# Patient Record
Sex: FEMALE | Race: BLACK OR AFRICAN AMERICAN | NOT HISPANIC OR LATINO | Employment: STUDENT | URBAN - METROPOLITAN AREA
[De-identification: names, ages, dates, MRNs, and addresses within clinical notes are randomized per-mention and may not be internally consistent; named-entity substitution may affect disease eponyms.]

---
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
## 2023-03-17 ENCOUNTER — OFFICE VISIT (OUTPATIENT)
Dept: FAMILY MEDICINE CLINIC | Facility: CLINIC | Age: 18
End: 2023-03-17
Payer: COMMERCIAL

## 2023-03-17 VITALS
WEIGHT: 165.8 LBS | HEART RATE: 71 BPM | HEIGHT: 65 IN | SYSTOLIC BLOOD PRESSURE: 110 MMHG | TEMPERATURE: 98.5 F | DIASTOLIC BLOOD PRESSURE: 80 MMHG | OXYGEN SATURATION: 98 % | BODY MASS INDEX: 27.63 KG/M2

## 2023-03-17 DIAGNOSIS — N63.21 MASS OF UPPER OUTER QUADRANT OF LEFT BREAST: Primary | ICD-10-CM

## 2023-03-17 DIAGNOSIS — F32.9 MAJOR DEPRESSIVE DISORDER WITH CURRENT ACTIVE EPISODE, UNSPECIFIED DEPRESSION EPISODE SEVERITY, UNSPECIFIED WHETHER RECURRENT: ICD-10-CM

## 2023-03-17 PROCEDURE — 99214 OFFICE O/P EST MOD 30 MIN: CPT | Performed by: FAMILY MEDICINE

## 2023-03-17 PROCEDURE — 1160F RVW MEDS BY RX/DR IN RCRD: CPT | Performed by: FAMILY MEDICINE

## 2023-03-17 PROCEDURE — 1159F MED LIST DOCD IN RCRD: CPT | Performed by: FAMILY MEDICINE

## 2023-03-17 RX ORDER — ARIPIPRAZOLE 10 MG/1
TABLET ORAL
COMMUNITY
Start: 2023-03-14 | End: 2023-03-17 | Stop reason: SDUPTHER

## 2023-03-17 RX ORDER — ARIPIPRAZOLE 10 MG/1
10 TABLET ORAL DAILY
Qty: 90 TABLET | Refills: 3 | Status: SHIPPED | OUTPATIENT
Start: 2023-03-17

## 2023-03-17 NOTE — PROGRESS NOTES
"Subjective   WALDEMAR Parker is a 17 y.o. female.     Chief Complaint   Patient presents with   • Cyst     Left breast, inside sensitive, knot for at least a month        History of Present Illness   Patient has had a sensitive left breast not for at least a month now.  I can see where she has had this in the past.  She saw a breast surgeon and had a cyst aspirated about 9 months ago.  From that left breast.    Pt was recently in the [place] for anxiety. She was started on Abilify and doing much better. She was not diagnosed bipolar. Cbc and cmp one week ago was ok.     The following portions of the patient's history were reviewed and updated as appropriate: allergies, current medications, past family history, past medical history, past social history, past surgical history and problem list.    History reviewed. No pertinent past medical history.    Past Surgical History:   Procedure Laterality Date   • HERNIA REPAIR     • NOSE SURGERY  01/01/2020       Family History   Problem Relation Age of Onset   • Cancer Mother 33        pelvic lymphoma       Social History     Socioeconomic History   • Marital status: Single   Tobacco Use   • Smoking status: Never   • Smokeless tobacco: Never   • Tobacco comments:     occasional vapes   Vaping Use   • Vaping Use: Some days   Substance and Sexual Activity   • Alcohol use: Yes     Comment: occ   • Drug use: Never   • Sexual activity: Defer       Review of Systems   Respiratory: Negative for shortness of breath.    Psychiatric/Behavioral: Negative for suicidal ideas.       Objective   Visit Vitals  /80 (BP Location: Left arm, Patient Position: Sitting)   Pulse 71   Temp 98.5 °F (36.9 °C)   Ht 165.1 cm (65\")   Wt 75.2 kg (165 lb 12.8 oz)   SpO2 98%   BMI 27.59 kg/m²     Body mass index is 27.59 kg/m².  Physical Exam  Constitutional:       Appearance: Normal appearance. She is well-developed.   Cardiovascular:      Rate and Rhythm: Normal rate and regular rhythm.      Heart " sounds: Normal heart sounds.   Pulmonary:      Effort: Pulmonary effort is normal.      Breath sounds: Normal breath sounds.   Chest:       Musculoskeletal:         General: No swelling. Normal range of motion.   Skin:     General: Skin is warm and dry.      Findings: No rash.   Neurological:      General: No focal deficit present.      Mental Status: She is alert and oriented to person, place, and time.   Psychiatric:         Mood and Affect: Mood normal.         Behavior: Behavior normal.           Assessment & Plan   Diagnoses and all orders for this visit:    1. Mass of upper outer quadrant of left breast (Primary)  -     US Breast Left Limited; Future    2. Major depressive disorder with current active episode, unspecified depression episode severity, unspecified whether recurrent  -     ARIPiprazole (ABILIFY) 10 MG tablet; Take 1 tablet by mouth Daily.  Dispense: 90 tablet; Refill: 3        Cont meds, f/u in 1-3 months. Will refer to fransico jean pending results.

## 2023-04-13 ENCOUNTER — HOSPITAL ENCOUNTER (OUTPATIENT)
Dept: ULTRASOUND IMAGING | Facility: HOSPITAL | Age: 18
Discharge: HOME | End: 2023-04-13
Admitting: FAMILY MEDICINE
Payer: COMMERCIAL

## 2023-04-13 DIAGNOSIS — N63.21 MASS OF UPPER OUTER QUADRANT OF LEFT BREAST: ICD-10-CM

## 2023-04-13 PROCEDURE — 76642 ULTRASOUND BREAST LIMITED: CPT

## 2023-06-05 DIAGNOSIS — S69.92XA INJURY OF FINGER OF LEFT HAND, INITIAL ENCOUNTER: ICD-10-CM

## 2023-06-05 RX ORDER — IBUPROFEN 400 MG/1
400 TABLET ORAL EVERY 8 HOURS PRN
Qty: 60 TABLET | Refills: 1 | Status: CANCELLED | OUTPATIENT
Start: 2023-06-05

## 2023-06-06 DIAGNOSIS — S69.92XA INJURY OF FINGER OF LEFT HAND, INITIAL ENCOUNTER: ICD-10-CM

## 2023-06-07 RX ORDER — IBUPROFEN 400 MG/1
400 TABLET ORAL EVERY 8 HOURS PRN
Qty: 60 TABLET | Refills: 1 | Status: SHIPPED | OUTPATIENT
Start: 2023-06-07

## 2023-09-14 ENCOUNTER — TELEPHONE (OUTPATIENT)
Dept: FAMILY MEDICINE CLINIC | Facility: CLINIC | Age: 18
End: 2023-09-14

## 2023-09-14 NOTE — TELEPHONE ENCOUNTER
Caller: iker maravilla    Relationship: Mother    Best call back number: [phone]     What is the best time to reach you: ANYTIME    Who are you requesting to speak with (clinical staff, provider,  specific staff member):CLINICAL     What was the call regarding: PATIENTS MOTHER CALLING WANTING TO KNOW IF SHE CAN  BE REFERRED TO SOMEONE THAT CAN REMOVE A CYST UNDER THE ARMPIT SHE STATED THAT IT IS STARTING TO BOTHER HER     Is it okay if the provider responds through MyChart:

## 2023-09-15 NOTE — TELEPHONE ENCOUNTER
I called and spoke with the patients mother after I checked verbal release. She understood she needed to have an appointment for the referral so I have scheduled her with Dr Sierra with her permission. She is scheduled for next Tuesday. If that appointment ends up not working she will call back and rescheduled.

## 2023-09-19 ENCOUNTER — OFFICE VISIT (OUTPATIENT)
Dept: FAMILY MEDICINE CLINIC | Facility: CLINIC | Age: 18
End: 2023-09-19
Payer: COMMERCIAL

## 2023-09-19 VITALS
WEIGHT: 195 LBS | DIASTOLIC BLOOD PRESSURE: 67 MMHG | SYSTOLIC BLOOD PRESSURE: 104 MMHG | TEMPERATURE: 97.5 F | HEART RATE: 87 BPM | OXYGEN SATURATION: 96 % | BODY MASS INDEX: 32.45 KG/M2

## 2023-09-19 DIAGNOSIS — L98.9 BENIGN SKIN GROWTH: Primary | ICD-10-CM

## 2023-09-19 PROCEDURE — 99213 OFFICE O/P EST LOW 20 MIN: CPT | Performed by: STUDENT IN AN ORGANIZED HEALTH CARE EDUCATION/TRAINING PROGRAM

## 2023-09-19 NOTE — PROGRESS NOTES
"Chief Complaint  Cyst    Subjective        WALDEMAR Parker presents to [facility]  History of Present Illness    Has a growth under left arm that has been there for about one year. Seems to be getting bigger and is bothersome. Is in bra line and is very uncomfortable.     Objective   Vital Signs:  /67 (BP Location: Left arm, Patient Position: Sitting, Cuff Size: Adult)   Pulse 87   Temp 97.5 °F (36.4 °C)   Wt 88.5 kg (195 lb)   SpO2 96%   BMI 32.45 kg/m²   Estimated body mass index is 32.45 kg/m² as calculated from the following:    Height as of 3/17/23: 165.1 cm (65\").    Weight as of this encounter: 88.5 kg (195 lb).  Facility age limit for growth percentiles is 20 years.          Physical Exam  Vitals and nursing note reviewed.   Constitutional:       General: She is not in acute distress.     Appearance: Normal appearance. She is not ill-appearing.   HENT:      Head: Normocephalic and atraumatic.      Nose: Nose normal.      Mouth/Throat:      Mouth: Mucous membranes are moist.   Eyes:      Extraocular Movements: Extraocular movements intact.      Conjunctiva/sclera: Conjunctivae normal.   Cardiovascular:      Rate and Rhythm: Normal rate.   Pulmonary:      Effort: Pulmonary effort is normal.   Skin:     General: Skin is warm and dry.      Comments: Skin growth under left arm    Neurological:      General: No focal deficit present.      Mental Status: She is alert and oriented to person, place, and time. Mental status is at baseline.   Psychiatric:         Mood and Affect: Mood normal.         Behavior: Behavior normal.      Result Review :                   Assessment and Plan   Diagnoses and all orders for this visit:    1. Benign skin growth (Primary)  -     Ambulatory Referral to Dermatology             Follow Up   Return if symptoms worsen or fail to improve.  Patient was given instructions and counseling regarding her condition or for health maintenance advice. " Please see specific information pulled into the AVS if appropriate.

## 2023-09-22 DIAGNOSIS — S69.92XA INJURY OF FINGER OF LEFT HAND, INITIAL ENCOUNTER: ICD-10-CM

## 2023-09-25 RX ORDER — IBUPROFEN 400 MG/1
TABLET ORAL
Qty: 60 TABLET | Refills: 1 | Status: SHIPPED | OUTPATIENT
Start: 2023-09-25

## 2024-02-05 ENCOUNTER — OFFICE VISIT (OUTPATIENT)
Dept: FAMILY MEDICINE CLINIC | Facility: CLINIC | Age: 19
End: 2024-02-05
Payer: COMMERCIAL

## 2024-02-05 VITALS
HEART RATE: 86 BPM | TEMPERATURE: 97.8 F | OXYGEN SATURATION: 99 % | BODY MASS INDEX: 33.45 KG/M2 | HEIGHT: 65 IN | WEIGHT: 200.8 LBS | DIASTOLIC BLOOD PRESSURE: 66 MMHG | SYSTOLIC BLOOD PRESSURE: 100 MMHG

## 2024-02-05 DIAGNOSIS — L98.9 SKIN LESION OF CHEST WALL: Primary | ICD-10-CM

## 2024-02-05 PROCEDURE — 1159F MED LIST DOCD IN RCRD: CPT | Performed by: FAMILY MEDICINE

## 2024-02-05 PROCEDURE — 99213 OFFICE O/P EST LOW 20 MIN: CPT | Performed by: FAMILY MEDICINE

## 2024-02-05 PROCEDURE — 1160F RVW MEDS BY RX/DR IN RCRD: CPT | Performed by: FAMILY MEDICINE

## 2024-02-05 RX ORDER — IBUPROFEN 600 MG/1
TABLET ORAL
COMMUNITY
Start: 2023-12-11

## 2024-02-05 RX ORDER — CHLORHEXIDINE GLUCONATE ORAL RINSE 1.2 MG/ML
SOLUTION DENTAL
COMMUNITY
Start: 2023-12-09

## 2024-02-05 NOTE — PROGRESS NOTES
"Subjective   WALDEMAR Parker is a 18 y.o. female.     Chief Complaint   Patient presents with    Cyst     Under armpit    Breast Pain       History of Present Illness   Pt has a cyst under her left arm that is painful. Not in her breast. For over a year. Has had a cyst in her breast aspirated before that was benign. Neg ultrasound of left breast a little under a year ago. Has been referred to Derm for cyst. She never got the appointment. The area is growing and getting hard.     The following portions of the patient's history were reviewed and updated as appropriate: allergies, current medications, past family history, past medical history, past social history, past surgical history and problem list.    History reviewed. No pertinent past medical history.    Past Surgical History:   Procedure Laterality Date    HERNIA REPAIR      NOSE SURGERY  01/01/2020       Family History   Problem Relation Age of Onset    Cancer Mother 33        pelvic lymphoma       Social History     Socioeconomic History    Marital status: Single   Tobacco Use    Smoking status: Never    Smokeless tobacco: Never    Tobacco comments:     occasional vapes   Vaping Use    Vaping Use: Some days   Substance and Sexual Activity    Alcohol use: Yes     Comment: occ    Drug use: Never    Sexual activity: Defer       Review of Systems   Constitutional:  Negative for fever and unexpected weight loss.       Objective   Visit Vitals  /66 (BP Location: Right arm, Patient Position: Sitting, Cuff Size: Large Adult)   Pulse 86   Temp 97.8 °F (36.6 °C)   Ht 165.1 cm (65\")   Wt 91.1 kg (200 lb 12.8 oz)   LMP 01/29/2024 (Approximate)   SpO2 99%   BMI 33.41 kg/m²     Body mass index is 33.41 kg/m².  Physical Exam  Constitutional:       General: She is not in acute distress.     Appearance: Normal appearance.   Skin:     Comments: Cystic lesion under left arm.    Neurological:      General: No focal deficit present.      Mental Status: She is alert and " oriented to person, place, and time.   Psychiatric:         Mood and Affect: Mood normal.         Behavior: Behavior normal.           Assessment & Plan   Diagnoses and all orders for this visit:    1. Skin lesion of chest wall (Primary)  -     Ambulatory Referral to General Surgery        F/u as needed.

## 2024-02-14 ENCOUNTER — OFFICE VISIT (OUTPATIENT)
Dept: SURGERY | Facility: CLINIC | Age: 19
End: 2024-02-14
Payer: COMMERCIAL

## 2024-02-14 VITALS
SYSTOLIC BLOOD PRESSURE: 112 MMHG | BODY MASS INDEX: 33.29 KG/M2 | WEIGHT: 199.8 LBS | HEIGHT: 65 IN | DIASTOLIC BLOOD PRESSURE: 78 MMHG

## 2024-02-14 DIAGNOSIS — L98.9 SKIN LESION OF CHEST WALL: Primary | ICD-10-CM

## 2024-02-14 PROCEDURE — 88304 TISSUE EXAM BY PATHOLOGIST: CPT | Performed by: SURGERY

## 2024-02-16 LAB
LAB AP CASE REPORT: NORMAL
PATH REPORT.FINAL DX SPEC: NORMAL
PATH REPORT.GROSS SPEC: NORMAL

## 2024-07-03 ENCOUNTER — OFFICE VISIT (OUTPATIENT)
Dept: FAMILY MEDICINE CLINIC | Facility: CLINIC | Age: 19
End: 2024-07-03
Payer: COMMERCIAL

## 2024-07-03 VITALS
HEART RATE: 72 BPM | DIASTOLIC BLOOD PRESSURE: 82 MMHG | WEIGHT: 204 LBS | SYSTOLIC BLOOD PRESSURE: 132 MMHG | HEIGHT: 65 IN | OXYGEN SATURATION: 98 % | BODY MASS INDEX: 33.99 KG/M2

## 2024-07-03 DIAGNOSIS — Z00.00 ENCOUNTER FOR ANNUAL PHYSICAL EXAM: Primary | ICD-10-CM

## 2024-07-03 DIAGNOSIS — Z23 IMMUNIZATION DUE: ICD-10-CM

## 2024-07-03 PROBLEM — E66.9 OBESITY (BMI 30.0-34.9): Status: ACTIVE | Noted: 2024-07-03

## 2024-07-03 PROBLEM — E66.811 OBESITY (BMI 30.0-34.9): Status: ACTIVE | Noted: 2024-07-03

## 2024-07-03 PROCEDURE — 90651 9VHPV VACCINE 2/3 DOSE IM: CPT

## 2024-07-03 PROCEDURE — 99395 PREV VISIT EST AGE 18-39: CPT

## 2024-07-03 PROCEDURE — 1125F AMNT PAIN NOTED PAIN PRSNT: CPT

## 2024-07-03 PROCEDURE — 1160F RVW MEDS BY RX/DR IN RCRD: CPT

## 2024-07-03 PROCEDURE — 90471 IMMUNIZATION ADMIN: CPT

## 2024-07-03 PROCEDURE — 2014F MENTAL STATUS ASSESS: CPT

## 2024-07-03 PROCEDURE — 1159F MED LIST DOCD IN RCRD: CPT

## 2024-07-03 NOTE — PROGRESS NOTES
"Chief Complaint  Annual Exam    Subjective        WALDEMAR Parker presents to [facility] for annual exam.    History of Present Illness      Sister present with patient during exam.      Past Medical History:   Diagnosis Date    Allergic     Seasonal    Clotting disorder 2003    Fibrocystic breast 04/2023        Past Surgical History:   Procedure Laterality Date    HERNIA REPAIR      NOSE SURGERY  01/01/2020      Family History   Problem Relation Age of Onset    Cancer Mother 33        pelvic lymphoma    Hypertension Mother     Asthma Sister     Asthma Sister          PHQ-2 Depression Screening  Little interest or pleasure in doing things? 0-->not at all   Feeling down, depressed, or hopeless? 0-->not at all   PHQ-2 Total Score 0     Social History     Socioeconomic History    Marital status: Single   Tobacco Use    Smoking status: Never    Smokeless tobacco: Never    Tobacco comments:     occasional vapes   Vaping Use    Vaping status: Some Days   Substance and Sexual Activity    Alcohol use: Yes     Comment: Socially    Drug use: Yes     Frequency: 7.0 times per week     Types: Marijuana    Sexual activity: Yes     Partners: Male     Birth control/protection: Condom       Dentist: last year  Vision: glasses, last vision exam 2 years.   Reproductive health: She is sexually active and practices safe sex.         Health Maintenance  Bmi  Hpv: only had one, needs second dose.  Hep c denies  Chlamydia denies  Pap: denies never had one. Would like ob referral.  Annual today  Covid denies  Mother to send us updated vaccines records.                                  Objective   Vital Signs:  /82   Pulse 72   Ht 165.1 cm (65\")   Wt 92.5 kg (204 lb)   SpO2 98%   BMI 33.95 kg/m²   Estimated body mass index is 33.95 kg/m² as calculated from the following:    Height as of this encounter: 165.1 cm (65\").    Weight as of this encounter: 92.5 kg (204 lb).       BMI is >= 30 and <35. " (Class 1 Obesity). The following options were offered after discussion;: weight loss educational material (shared in after visit summary), exercise counseling/recommendations, and nutrition counseling/recommendations      Physical Exam  Vitals reviewed.   Constitutional:       General: She is not in acute distress.     Appearance: Normal appearance. She is obese.   HENT:      Head: Normocephalic and atraumatic.      Right Ear: Tympanic membrane normal.      Left Ear: Tympanic membrane normal.      Nose: Nose normal. No congestion.      Mouth/Throat:      Mouth: Mucous membranes are moist.      Pharynx: No oropharyngeal exudate or posterior oropharyngeal erythema.   Eyes:      Conjunctiva/sclera: Conjunctivae normal.      Pupils: Pupils are equal, round, and reactive to light.   Cardiovascular:      Rate and Rhythm: Normal rate and regular rhythm.      Pulses: Normal pulses.      Heart sounds: No murmur heard.     No gallop.   Pulmonary:      Effort: Pulmonary effort is normal. No respiratory distress.      Breath sounds: Normal breath sounds. No wheezing.   Abdominal:      General: Bowel sounds are normal. There is no distension.      Palpations: Abdomen is soft.      Tenderness: There is no abdominal tenderness.   Musculoskeletal:         General: Normal range of motion.      Cervical back: Normal range of motion and neck supple. No tenderness.      Right lower leg: No edema.      Left lower leg: No edema.   Skin:     General: Skin is warm and dry.   Neurological:      Mental Status: She is alert and oriented to person, place, and time. Mental status is at baseline.   Psychiatric:         Mood and Affect: Mood normal.        Result Review :    The following data was reviewed by: Maria E Hargrove APRN on 07/03/2024:                 Assessment and Plan     Diagnoses and all orders for this visit:    1. Encounter for annual physical exam (Primary)  -     Ambulatory Referral to Obstetrics / Gynecology    2.  Immunization due  -     HPV Vaccine (HPV9)    Have mother send us vaccine records.     Encourage healthy diet and exercise.  Encourage patient to stay up to date on screening examinations as indicated based on age and risk factors.   F/U yearly.     Follow Up     Return in about 1 year (around 7/3/2025), or if symptoms worsen or fail to improve, for Annual physical.  Patient was given instructions and counseling regarding her condition or for health maintenance advice. Please see specific information pulled into the AVS if appropriate.

## 2025-07-17 ENCOUNTER — TELEPHONE (OUTPATIENT)
Dept: FAMILY MEDICINE CLINIC | Facility: CLINIC | Age: 20
End: 2025-07-17

## 2025-08-12 ENCOUNTER — OFFICE VISIT (OUTPATIENT)
Dept: FAMILY MEDICINE CLINIC | Facility: CLINIC | Age: 20
End: 2025-08-12
Payer: COMMERCIAL

## 2025-08-12 VITALS
HEIGHT: 64 IN | SYSTOLIC BLOOD PRESSURE: 100 MMHG | BODY MASS INDEX: 32.95 KG/M2 | WEIGHT: 193 LBS | HEART RATE: 93 BPM | OXYGEN SATURATION: 98 % | DIASTOLIC BLOOD PRESSURE: 60 MMHG

## 2025-08-12 DIAGNOSIS — L72.0 EPIDERMAL INCLUSION CYST: Primary | ICD-10-CM

## 2025-08-12 PROCEDURE — 1159F MED LIST DOCD IN RCRD: CPT

## 2025-08-12 PROCEDURE — 1125F AMNT PAIN NOTED PAIN PRSNT: CPT

## 2025-08-12 PROCEDURE — 1160F RVW MEDS BY RX/DR IN RCRD: CPT

## 2025-08-12 PROCEDURE — 99213 OFFICE O/P EST LOW 20 MIN: CPT

## 2025-08-13 ENCOUNTER — OFFICE VISIT (OUTPATIENT)
Dept: SURGERY | Facility: CLINIC | Age: 20
End: 2025-08-13
Payer: COMMERCIAL

## 2025-08-13 VITALS
OXYGEN SATURATION: 98 % | HEART RATE: 93 BPM | SYSTOLIC BLOOD PRESSURE: 100 MMHG | DIASTOLIC BLOOD PRESSURE: 60 MMHG | BODY MASS INDEX: 32.27 KG/M2 | WEIGHT: 189 LBS | HEIGHT: 64 IN

## 2025-08-13 DIAGNOSIS — L72.3 SEBACEOUS CYST: Primary | ICD-10-CM

## 2025-08-13 PROCEDURE — 99213 OFFICE O/P EST LOW 20 MIN: CPT | Performed by: STUDENT IN AN ORGANIZED HEALTH CARE EDUCATION/TRAINING PROGRAM

## 2025-08-13 PROCEDURE — 1159F MED LIST DOCD IN RCRD: CPT | Performed by: STUDENT IN AN ORGANIZED HEALTH CARE EDUCATION/TRAINING PROGRAM

## 2025-08-13 PROCEDURE — 1160F RVW MEDS BY RX/DR IN RCRD: CPT | Performed by: STUDENT IN AN ORGANIZED HEALTH CARE EDUCATION/TRAINING PROGRAM

## 2025-08-13 RX ORDER — CLINDAMYCIN HYDROCHLORIDE 150 MG/1
150 CAPSULE ORAL 4 TIMES DAILY
Qty: 20 CAPSULE | Refills: 0 | Status: SHIPPED | OUTPATIENT
Start: 2025-08-13 | End: 2025-08-18

## 2025-08-14 ENCOUNTER — TELEPHONE (OUTPATIENT)
Dept: FAMILY MEDICINE CLINIC | Facility: CLINIC | Age: 20
End: 2025-08-14
Payer: COMMERCIAL